# Patient Record
Sex: MALE | Race: WHITE | NOT HISPANIC OR LATINO | URBAN - METROPOLITAN AREA
[De-identification: names, ages, dates, MRNs, and addresses within clinical notes are randomized per-mention and may not be internally consistent; named-entity substitution may affect disease eponyms.]

---

## 2018-06-29 PROBLEM — R60.1 ANASARCA: Status: ACTIVE | Noted: 2018-06-29

## 2018-06-29 PROBLEM — F45.8 ANXIETY HYPERVENTILATION: Status: ACTIVE | Noted: 2018-06-29

## 2018-06-29 PROBLEM — N17.9 ACUTE KIDNEY FAILURE, UNSPECIFIED: Status: ACTIVE | Noted: 2018-06-29

## 2018-06-29 PROBLEM — I27.20: Status: ACTIVE | Noted: 2018-06-29

## 2018-06-29 PROBLEM — N18.30 CHRONIC KIDNEY DISEASE, STAGE III (MODERATE): Status: ACTIVE | Noted: 2018-06-29

## 2018-06-29 PROBLEM — D47.3 THROMBOCYTHEMIA, ESSENTIAL: Status: ACTIVE | Noted: 2018-06-29

## 2018-06-29 PROBLEM — F41.9 ANXIETY HYPERVENTILATION: Status: ACTIVE | Noted: 2018-06-29

## 2022-01-02 PROBLEM — I10 PRIMARY HYPERTENSION: Status: ACTIVE | Noted: 2022-01-02

## 2022-01-02 PROBLEM — J12.82 PNEUMONIA DUE TO COVID-19 VIRUS: Status: ACTIVE | Noted: 2022-01-02

## 2022-01-02 PROBLEM — U07.1 PNEUMONIA DUE TO COVID-19 VIRUS: Status: ACTIVE | Noted: 2022-01-02

## 2022-01-02 PROBLEM — D68.9 COAGULATION DEFECT: Status: ACTIVE | Noted: 2022-01-02

## 2022-01-02 PROBLEM — J96.01 ACUTE HYPOXEMIC RESPIRATORY FAILURE: Status: ACTIVE | Noted: 2022-01-02

## 2022-01-21 ENCOUNTER — EXTERNAL HOME HEALTH (OUTPATIENT)
Dept: HOME HEALTH SERVICES | Facility: HOSPITAL | Age: 77
End: 2022-01-21
Payer: COMMERCIAL

## 2023-04-03 ENCOUNTER — TELEPHONE (OUTPATIENT)
Dept: NEUROLOGY | Facility: CLINIC | Age: 78
End: 2023-04-03
Payer: MEDICARE

## 2023-04-03 NOTE — TELEPHONE ENCOUNTER
Spoke to the pt's wife, she will call back to schedule the pt an appt for memory issues.  Portal message sent.  Left message on the pt's phone in regards to scheduling an appt.  Pt's father  of Alzheimer's disease.

## 2025-05-27 PROBLEM — R79.89 ELEVATED TROPONIN: Status: ACTIVE | Noted: 2025-05-27

## 2025-05-27 PROBLEM — I48.0 PAROXYSMAL A-FIB: Status: ACTIVE | Noted: 2025-05-27

## 2025-05-27 PROBLEM — Z71.89 ACP (ADVANCE CARE PLANNING): Status: ACTIVE | Noted: 2025-05-27

## 2025-05-27 PROBLEM — Z74.09 COVID-19 LONG HAULER MANIFESTING CHRONIC DECREASED MOBILITY AND ENDURANCE: Status: ACTIVE | Noted: 2025-05-27

## 2025-05-27 PROBLEM — U09.9 COVID-19 LONG HAULER MANIFESTING CHRONIC DECREASED MOBILITY AND ENDURANCE: Status: ACTIVE | Noted: 2025-05-27

## 2025-06-01 PROBLEM — N17.9 ACUTE KIDNEY INJURY SUPERIMPOSED ON CHRONIC KIDNEY DISEASE: Status: ACTIVE | Noted: 2025-06-01

## 2025-06-01 PROBLEM — N18.9 ACUTE KIDNEY INJURY SUPERIMPOSED ON CHRONIC KIDNEY DISEASE: Status: ACTIVE | Noted: 2025-06-01

## 2025-06-01 PROBLEM — N40.0 BPH (BENIGN PROSTATIC HYPERPLASIA): Status: ACTIVE | Noted: 2025-06-01

## 2025-06-01 PROBLEM — E87.5 HYPERKALEMIA: Status: ACTIVE | Noted: 2025-06-01

## 2025-06-01 PROBLEM — I27.20 PULMONARY HYPERTENSION: Status: ACTIVE | Noted: 2025-06-01

## 2025-06-01 PROBLEM — I50.32 CHRONIC HEART FAILURE WITH PRESERVED EJECTION FRACTION (HFPEF): Status: ACTIVE | Noted: 2025-06-01

## 2025-06-01 PROBLEM — E78.5 HLD (HYPERLIPIDEMIA): Status: ACTIVE | Noted: 2025-06-01

## 2025-06-01 PROBLEM — K21.9 GERD (GASTROESOPHAGEAL REFLUX DISEASE): Status: ACTIVE | Noted: 2025-06-01

## 2025-06-01 PROBLEM — D72.829 LEUKOCYTOSIS: Status: ACTIVE | Noted: 2025-06-01

## 2025-06-01 PROBLEM — D69.6 THROMBOCYTOPENIA: Status: ACTIVE | Noted: 2025-06-01

## 2025-06-01 PROBLEM — I48.91 ATRIAL FIBRILLATION WITH RVR: Status: ACTIVE | Noted: 2025-06-01

## 2025-06-01 PROBLEM — I50.33 ACUTE ON CHRONIC HEART FAILURE WITH PRESERVED EJECTION FRACTION (HFPEF): Status: ACTIVE | Noted: 2025-06-01

## 2025-06-02 ENCOUNTER — TELEPHONE (OUTPATIENT)
Dept: VASCULAR SURGERY | Facility: CLINIC | Age: 80
End: 2025-06-02
Payer: MEDICARE

## 2025-06-02 PROBLEM — I38 ENDOCARDITIS: Status: ACTIVE | Noted: 2025-06-02

## 2025-06-03 PROBLEM — I34.0 MITRAL VALVE INSUFFICIENCY: Status: ACTIVE | Noted: 2025-06-03

## 2025-06-04 PROBLEM — E87.6 HYPOKALEMIA: Status: ACTIVE | Noted: 2025-06-01

## 2025-06-06 PROBLEM — I48.91 ATRIAL FIBRILLATION WITH RVR: Status: RESOLVED | Noted: 2025-06-01 | Resolved: 2025-06-06

## 2025-06-06 PROBLEM — I50.33 ACUTE ON CHRONIC HEART FAILURE WITH PRESERVED EJECTION FRACTION (HFPEF): Status: RESOLVED | Noted: 2025-06-01 | Resolved: 2025-06-06

## 2025-06-06 PROBLEM — N18.9 ACUTE KIDNEY INJURY SUPERIMPOSED ON CHRONIC KIDNEY DISEASE: Status: RESOLVED | Noted: 2025-06-01 | Resolved: 2025-06-06

## 2025-06-06 PROBLEM — J81.1 PULMONARY EDEMA: Status: ACTIVE | Noted: 2025-06-06

## 2025-06-06 PROBLEM — E87.6 HYPOKALEMIA: Status: RESOLVED | Noted: 2025-06-01 | Resolved: 2025-06-06

## 2025-06-06 PROBLEM — D72.829 LEUKOCYTOSIS: Status: RESOLVED | Noted: 2025-06-01 | Resolved: 2025-06-06

## 2025-06-06 PROBLEM — N17.9 ACUTE KIDNEY INJURY SUPERIMPOSED ON CHRONIC KIDNEY DISEASE: Status: RESOLVED | Noted: 2025-06-01 | Resolved: 2025-06-06

## 2025-06-09 ENCOUNTER — TELEPHONE (OUTPATIENT)
Dept: VASCULAR SURGERY | Facility: CLINIC | Age: 80
End: 2025-06-09
Payer: MEDICARE

## 2025-06-16 ENCOUNTER — TELEPHONE (OUTPATIENT)
Dept: VASCULAR SURGERY | Facility: CLINIC | Age: 80
End: 2025-06-16
Payer: MEDICARE

## 2025-06-16 DIAGNOSIS — I34.0 MITRAL VALVE INSUFFICIENCY, UNSPECIFIED ETIOLOGY: Primary | ICD-10-CM

## 2025-06-16 NOTE — TELEPHONE ENCOUNTER
Copied from CRM #[id number]. Topic: General Inquiry - Patient Advice  >> Jun 16, 2025 10:19 AM Raul wrote:  Type: Needs Medical Advice  Who Called:  pt  Symptoms (please be specific):    How long has patient had these symptoms:    Pharmacy name and phone #:    Best Call Back Number: [phone]   Additional Information: Speak to staff   Pt states number doesn't come through to please text him   Thanks

## 2025-06-16 NOTE — TELEPHONE ENCOUNTER
Finishing antibiotics on 7/12 but want to be scheduled for surgery before Dr Baxter leaves on 7/12

## 2025-06-26 ENCOUNTER — OFFICE VISIT (OUTPATIENT)
Dept: INFECTIOUS DISEASES | Facility: CLINIC | Age: 80
End: 2025-06-26
Payer: MEDICARE

## 2025-06-26 ENCOUNTER — OFFICE VISIT (OUTPATIENT)
Dept: VASCULAR SURGERY | Facility: CLINIC | Age: 80
End: 2025-06-26
Payer: MEDICARE

## 2025-06-26 VITALS
TEMPERATURE: 99 F | SYSTOLIC BLOOD PRESSURE: 143 MMHG | DIASTOLIC BLOOD PRESSURE: 84 MMHG | HEART RATE: 85 BPM | RESPIRATION RATE: 14 BRPM | HEIGHT: 68 IN | WEIGHT: 162.06 LBS | BODY MASS INDEX: 24.56 KG/M2

## 2025-06-26 VITALS
BODY MASS INDEX: 24.55 KG/M2 | HEART RATE: 85 BPM | SYSTOLIC BLOOD PRESSURE: 143 MMHG | HEIGHT: 68 IN | DIASTOLIC BLOOD PRESSURE: 84 MMHG | WEIGHT: 162 LBS

## 2025-06-26 DIAGNOSIS — I34.0 MITRAL VALVE INSUFFICIENCY, UNSPECIFIED ETIOLOGY: ICD-10-CM

## 2025-06-26 DIAGNOSIS — I33.0 ACUTE BACTERIAL ENDOCARDITIS: Primary | ICD-10-CM

## 2025-06-26 DIAGNOSIS — I48.91 ATRIAL FIBRILLATION WITH RVR: Primary | ICD-10-CM

## 2025-06-26 PROCEDURE — 99214 OFFICE O/P EST MOD 30 MIN: CPT | Mod: PBBFAC,PN | Performed by: THORACIC SURGERY (CARDIOTHORACIC VASCULAR SURGERY)

## 2025-06-26 PROCEDURE — 99999 PR PBB SHADOW E&M-EST. PATIENT-LVL IV: CPT | Mod: PBBFAC,,, | Performed by: THORACIC SURGERY (CARDIOTHORACIC VASCULAR SURGERY)

## 2025-06-26 PROCEDURE — 99213 OFFICE O/P EST LOW 20 MIN: CPT | Mod: S$PBB,,, | Performed by: INTERNAL MEDICINE

## 2025-06-26 PROCEDURE — 99999 PR PBB SHADOW E&M-EST. PATIENT-LVL IV: CPT | Mod: PBBFAC,,, | Performed by: INTERNAL MEDICINE

## 2025-06-26 PROCEDURE — 99214 OFFICE O/P EST MOD 30 MIN: CPT | Mod: PBBFAC,27,PO | Performed by: INTERNAL MEDICINE

## 2025-06-26 RX ORDER — SILDENAFIL CITRATE 20 MG/1
40 TABLET ORAL 4 TIMES DAILY
COMMUNITY
Start: 2025-06-07

## 2025-06-26 NOTE — PROGRESS NOTES
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  It appears that patient has been complaining of palpitations  reason for which patient came to this hospital for further evaluation.     During initial evaluation in the ED patient was found to be hypotensive, tachycardic, slightly hypoxic.  Patient was noted to have irregular rhythm.    Initial workup showed evidence of leukocytosis and elevated creatinine.  Patient is Cardiology, Dr. Fitzpatrick performed a JESSICA with evidence of  mitral valve infective endocarditis     The patient endorses multiple issues previous month.  Recent teeth cleaning, percutaneous injection on piriformis  and recently diagnosed with pneumonia for which patient has been receiving multiple antibiotics and steroids     Evaluated by Infectious diseases at the time.  Blood cultures remained negative.  Further workup for culture negative endocarditis was also negative   Patient was started on daptomycin and ceftriaxone for 6 weeks empirically for the treatment of culture negative endocarditis expected to be finished on 07/14/2025    Interval HPI  Patient is here for follow-up.    Afebrile   Receiving ceftriaxone and daptomycin with no obvious side effects    Past Medical History:   Diagnosis Date    Anesthesia     D5NS shuts kidneys down, Sensitivity to anesthesia, SOB,  heartrate > 80bpm,  makes saturation rate decrease    CHF (congestive heart failure)     CKD (chronic kidney disease) stage 3, GFR 30-59 ml/min     Disease of pancreas, unspecified     Pancreatic pseudocyst    Hypertension     Kidney stone     Lymphedema     Lymph nodes removed left arm    Neuropraxia of left lower extremity     Left medial arch, can not control kicking.    Paroxysmal atrial fibrillation     Renal disorder     Thyroid disease         Past Surgical History:   Procedure Laterality Date    COLONOSCOPY      CHADWICK MAZE PROCEDURE N/A 7/11/2025    Procedure: CHADWICK MAZE PROCEDURE;  Surgeon: Jose Baxter MD;  Location: [facility] OR;  Service: Cardiothoracic;  Laterality: N/A;    ECHOCARDIOGRAM,TRANSESOPHAGEAL  7/11/2025    Procedure: Transesophageal echo (JESSICA) intra-procedure log documentation;  Surgeon: Jose Baxter MD;  Location: [facility] OR;  Service: Cardiothoracic;;    ECHOCARDIOGRAPHY      5 or more    INSERTION, CATHETER, TUNNELED  7/18/2025    Procedure: Tunnel Catheter placement Rm 2605;  Surgeon: Beryl Prieto MD;  Location: [facility] CATH;  Service: Vascular;;    LEFT HEART CATHETERIZATION  06/05/2025    Procedure: Left heart cath;  Surgeon: Leon Reyes MD;  Location: [facility] CATH;  Service: Cardiology;;    LYMPH NODE BIOPSY/EXCISION Left     left arm    MITRAL VALVE REPLACEMENT N/A 7/11/2025    Procedure: REPLACEMENT, MITRAL VALVE;  Surgeon: Jose Baxter MD;  Location: [facility] OR;  Service: Cardiothoracic;  Laterality: N/A;    TRANSESOPHAGEAL ECHOCARDIOGRAM WITH POSSIBLE CARDIOVERSION (JESSICA W/ POSS CARDIOVERSION) N/A 06/02/2025    Procedure: TRANSESOPHAGEAL ECHOCARDIOGRAM WITH POSSIBLE CARDIOVERSION (JESSICA W/ POSS CARDIOVERSION);  Surgeon: Leon Reyes MD;  Location: [facility];  Service: Cardiology;  Laterality: N/A;       Review of patient's allergies indicates:   Allergen Reactions    Metoprolol Other (See Comments) and Rash    Penicillins Other (See Comments) and Rash    Zithromax [azithromycin] Nausea Only     Mild       Family History   Problem Relation Name Age of Onset    Rectal cancer Mother      Dementia Father         Social History     Socioeconomic History    Marital status:    Tobacco Use    Smoking status: Never     Passive exposure: Never    Smokeless tobacco: Never   Vaping Use    Vaping status: Never Used   Substance and Sexual Activity    Alcohol use: Not Currently    Drug use: Never     Sexual activity: Not Currently     Social Drivers of Health     Financial Resource Strain: Low Risk  (7/11/2025)    Overall Financial Resource Strain (CARDIA)     Difficulty of Paying Living Expenses: Not hard at all   Food Insecurity: No Food Insecurity (7/11/2025)    Hunger Vital Sign     Worried About Running Out of Food in the Last Year: Never true     Ran Out of Food in the Last Year: Never true   Transportation Needs: No Transportation Needs (7/11/2025)    PRAPARE - Transportation     Lack of Transportation (Medical): No     Lack of Transportation (Non-Medical): No   Stress: No Stress Concern Present (7/11/2025)    [employer] [place] [employer] - Occupational Stress Questionnaire     Feeling of Stress : Not at all   Housing Stability: Low Risk  (7/11/2025)    Housing Stability Vital Sign     Unable to Pay for Housing in the Last Year: No     Number of Times Moved in the Last Year: 0     Homeless in the Last Year: No       Current Outpatient Medications   Medication Instructions    alfuzosin (UROXATRAL) 10 mg, 2 times daily    amLODIPine (NORVASC) 5 mg, Oral, Daily, Takes for migraine.    aspirin (ECOTRIN) 81 mg, Oral, Every morning    cholecalciferol (vitamin D3) 5,000 Units, Nightly    DAPTOmycin 50 mg/mL in 0.9% NaCl solution 600 mg, Intravenous, Daily    ferrous sulfate (FEOSOL) 325 mg, With breakfast    furosemide (LASIX) 20 mg, Oral, 2 times daily    HYDROcodone-acetaminophen (NORCO)  mg per tablet 1 tablet, Every 6 hours PRN    Lactobacillus rhamnosus GG (CULTURELLE) 10 billion cell capsule 1 capsule, Oral, Daily    latanoprost 0.005 % ophthalmic solution 1 drop, Both Eyes, Nightly    levothyroxine (SYNTHROID) 25 mcg, Before breakfast    LORazepam (ATIVAN) 0.5-1 mg, Oral, Every 6 hours PRN    nebivoloL (BYSTOLIC) 2.5 mg, 3 times daily    omeprazole 10 mg, Oral, Nightly    potassium chloride (K-TAB) 20 mEq 40 mEq, Oral, Daily    potassium chloride SA (K-DUR,KLOR-CON) 20 MEQ tablet 20 mEq,  Nightly    sildenafil (REVATIO) 60 mg, 2 times daily    testosterone cypionate (DEPOTESTOTERONE CYPIONATE) 200 mg/mL injection 0.7 mLs, Every Wednesday    valsartan (DIOVAN) 80 mg, Oral, 2 times daily    zolpidem (AMBIEN) 5 mg, Daily    zolpidem (AMBIEN) 5 mg, Nightly         Review of Systems   Constitutional:  Negative for activity change, chills, diaphoresis, fatigue, fever and unexpected weight change.   HENT:  Negative for sore throat.    Eyes:  Negative for photophobia and visual disturbance.   Respiratory:  Negative for cough and shortness of breath.    Cardiovascular:  Negative for chest pain and leg swelling.   Gastrointestinal:  Negative for abdominal distention, abdominal pain, nausea and vomiting.   Genitourinary:  Negative for difficulty urinating, dysuria and flank pain.   Musculoskeletal:  Negative for arthralgias.   Skin:  Negative for color change and rash.   Allergic/Immunologic: Negative for immunocompromised state.   Neurological:  Negative for dizziness and headaches.   Psychiatric/Behavioral:  The patient is not nervous/anxious.            Objective:     Vitals:    06/26/25 0909   BP: (!) 143/84   Pulse: 85   Resp: 14   Temp: 98.5 °F (36.9 °C)       Body mass index is 24.64 kg/m².         Physical Exam  Vitals reviewed.   Constitutional:       General: He is not in acute distress.     Appearance: Normal appearance. He is well-developed. He is not ill-appearing.   HENT:      Head: Normocephalic and atraumatic.      Right Ear: External ear normal.      Left Ear: External ear normal.      Nose: Nose normal.   Eyes:      General: No scleral icterus.        Right eye: No discharge.         Left eye: No discharge.      Pupils: Pupils are equal, round, and reactive to light.   Cardiovascular:      Rate and Rhythm: Normal rate and regular rhythm.      Heart sounds: Normal heart sounds. No murmur heard.  Pulmonary:      Effort: Pulmonary effort is normal. No respiratory distress.      Breath sounds:  Normal breath sounds. No wheezing.   Abdominal:      General: There is no distension.      Palpations: Abdomen is soft.      Tenderness: There is no abdominal tenderness.   Musculoskeletal:         General: Normal range of motion.      Cervical back: Normal range of motion and neck supple. No rigidity.   Lymphadenopathy:      Cervical: No cervical adenopathy.   Skin:     General: Skin is warm and dry.      Coloration: Skin is not jaundiced.   Neurological:      Mental Status: He is alert and oriented to person, place, and time.   Psychiatric:         Mood and Affect: Mood normal.         Speech: Speech normal.         Behavior: Behavior normal.         Judgment: Judgment normal.           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  - JESSICA: Mitral mass attached to the P2 on the left atrium size of the based highly suggestive  - 6/2/25 Blood cultures: Negative   - MRSA by PCR: Negative   - Lumbar MRI:  Negative for acute infection   - Bartonella AV:  Negative  - Q fever AV:  Negative  - Brucella ab:  Negative  - evaluated by CTS - will need MVR following IV abx course for endocarditis  - receiving IV CTX and Daptomycni since 6/2/25     # LISA on CKD  - appears to be resolving  - renally dose abx as needed     # Recent diagnosis of pneumonia  - MRSA by PCR:  negative  - no cough to collect sputum currently     # CHF     # Pulmonary hypertension    Moose was seen today for follow-up.    Diagnoses and all orders for this visit:    Acute bacterial endocarditis  -     Blood culture; Future  -     Blood culture; Future         Plan:     - Continue Daptomycin 8mg/kg IV q24h  - Continue Ceftriaxone 2g IV q24h  - Length of therapy: 6 weeks (EOC 7/14/25)       Greater than 20 minutes was spent on this encounter, which included: review of recent encounters, review and interpretation of labs/images, obtaining pertinent history, performing a physical examination, counseling and educating the  patient/family/caregiver, ordering medications/tests, documenting in the electronic health record, and coordinating care with necessary providers.    Milton Choi MD  Infectious Diseases

## 2025-06-26 NOTE — PROGRESS NOTES
This patient has severe mitral insufficiency.  He was recently hospitalized with endocarditis.  He is currently finishing out his treatment for the endocarditis with intravenous antibiotics.  Medicines are noted and part of the epic record.  His problem list was reviewed.    He has had renal insufficiency which largely has resolved.  He does have shortness of breath with exertion.  He does state that his oxygen saturation at baseline is in the low 90s.    He is not a smoker.    On exam vital signs are stable.  Pupils are equal and round reactive to light.  Neck is supple.  Chest is equal breath sounds.  Heart is in a regular rate and rhythm.  Abdomen is benign.  Perfusion to the legs and feet seems to be satisfactory.    The recent studies were reviewed.    Recommendation is for mitral valve replacement with pulmonary vein isolation and atrial appendage excision.  The procedure and risks were discussed.  The patient seems to be understanding and agreeable.

## 2025-06-30 ENCOUNTER — TELEPHONE (OUTPATIENT)
Dept: INFECTIOUS DISEASES | Facility: CLINIC | Age: 80
End: 2025-06-30

## 2025-06-30 NOTE — TELEPHONE ENCOUNTER
Pt seen ID clinic by Dr. Choi for follow up on 6/26/25.  [facility] or [facility] to remove PICC at EOC 7/14/25  Repeat blooc cultures on 7/20/25/25, will ask pt to please go to [facility] to have drawn or may send order to [facility], calvin

## 2025-07-01 DIAGNOSIS — I05.0 MITRAL VALVE STENOSIS: ICD-10-CM

## 2025-07-01 DIAGNOSIS — I34.0 MITRAL VALVE INSUFFICIENCY, UNSPECIFIED ETIOLOGY: Primary | ICD-10-CM

## 2025-07-01 RX ORDER — MUPIROCIN 20 MG/G
OINTMENT TOPICAL
OUTPATIENT
Start: 2025-07-01

## 2025-07-01 RX ORDER — CHLORHEXIDINE GLUCONATE ORAL RINSE 1.2 MG/ML
10 SOLUTION DENTAL
OUTPATIENT
Start: 2025-07-01

## 2025-07-01 RX ORDER — VANCOMYCIN/0.9 % SOD CHLORIDE 1 G/100 ML
1000 PLASTIC BAG, INJECTION (ML) INTRAVENOUS
OUTPATIENT
Start: 2025-07-01

## 2025-07-01 RX ORDER — HYDROCODONE BITARTRATE AND ACETAMINOPHEN 500; 5 MG/1; MG/1
TABLET ORAL
OUTPATIENT
Start: 2025-07-01

## 2025-07-02 ENCOUNTER — TELEPHONE (OUTPATIENT)
Dept: INFECTIOUS DISEASES | Facility: CLINIC | Age: 80
End: 2025-07-02
Payer: MEDICARE

## 2025-07-02 NOTE — TELEPHONE ENCOUNTER
Per Dr. Choi -   IV Daptomycin stopped today d/t pt's c/o possible allergic reaction and discussion.  Dr. Choi had discussion also with Dr. Prado.  Confirmed with Pk at \[facility]\"", stop IV Dapto, continue IV Ceftriaxone

## 2025-07-15 PROBLEM — J90 PLEURAL EFFUSION: Status: ACTIVE | Noted: 2025-07-15

## 2025-07-23 PROBLEM — D62 POSTOPERATIVE ANEMIA DUE TO ACUTE BLOOD LOSS: Status: ACTIVE | Noted: 2025-07-23

## 2025-07-23 PROBLEM — N18.32 STAGE 3B CHRONIC KIDNEY DISEASE: Status: ACTIVE | Noted: 2025-07-23

## 2025-07-23 PROBLEM — E87.20 METABOLIC ACIDOSIS: Status: ACTIVE | Noted: 2025-07-23

## 2025-07-23 PROBLEM — J84.10 PULMONARY FIBROSIS: Status: ACTIVE | Noted: 2025-07-23

## 2025-07-23 PROBLEM — E03.9 HYPOTHYROIDISM: Status: ACTIVE | Noted: 2025-07-23

## 2025-07-23 PROBLEM — I48.91 ATRIAL FIBRILLATION: Status: ACTIVE | Noted: 2025-07-23

## 2025-08-07 ENCOUNTER — OFFICE VISIT (OUTPATIENT)
Dept: VASCULAR SURGERY | Facility: CLINIC | Age: 80
End: 2025-08-07
Payer: MEDICARE

## 2025-08-07 ENCOUNTER — TELEPHONE (OUTPATIENT)
Dept: VASCULAR SURGERY | Facility: CLINIC | Age: 80
End: 2025-08-07
Payer: MEDICARE

## 2025-08-07 VITALS
DIASTOLIC BLOOD PRESSURE: 82 MMHG | SYSTOLIC BLOOD PRESSURE: 154 MMHG | WEIGHT: 170 LBS | BODY MASS INDEX: 25.84 KG/M2 | HEART RATE: 67 BPM

## 2025-08-07 DIAGNOSIS — Z95.2 S/P MITRAL VALVE REPLACEMENT: Primary | ICD-10-CM

## 2025-08-07 PROCEDURE — 99999 PR PBB SHADOW E&M-EST. PATIENT-LVL II: CPT | Mod: PBBFAC,,, | Performed by: THORACIC SURGERY (CARDIOTHORACIC VASCULAR SURGERY)

## 2025-08-07 PROCEDURE — 99024 POSTOP FOLLOW-UP VISIT: CPT | Mod: POP,,, | Performed by: THORACIC SURGERY (CARDIOTHORACIC VASCULAR SURGERY)

## 2025-08-07 PROCEDURE — 99212 OFFICE O/P EST SF 10 MIN: CPT | Mod: PBBFAC,PN | Performed by: THORACIC SURGERY (CARDIOTHORACIC VASCULAR SURGERY)

## 2025-08-07 RX ORDER — PREDNISOLONE 5 MG/1
TABLET ORAL
COMMUNITY
Start: 2025-07-22

## 2025-08-07 RX ORDER — AMLODIPINE BESYLATE 5 MG/1
TABLET ORAL
COMMUNITY

## 2025-08-07 RX ORDER — QUETIAPINE FUMARATE 25 MG/1
25 TABLET, FILM COATED ORAL NIGHTLY
COMMUNITY
Start: 2025-08-04

## 2025-08-07 RX ORDER — POTASSIUM CHLORIDE 20 MEQ/1
20 TABLET, EXTENDED RELEASE ORAL
COMMUNITY

## 2025-08-07 RX ORDER — FENOFIBRATE 160 MG/1
TABLET ORAL
COMMUNITY

## 2025-08-07 RX ORDER — LEVOTHYROXINE SODIUM 50 UG/1
50 TABLET ORAL
COMMUNITY
Start: 2025-08-04

## 2025-08-07 RX ORDER — HYDROXYZINE PAMOATE 25 MG/1
25 CAPSULE ORAL EVERY 4 HOURS PRN
COMMUNITY
Start: 2025-08-03

## 2025-08-07 RX ORDER — AMIODARONE HYDROCHLORIDE 100 MG/1
100 TABLET ORAL
COMMUNITY
Start: 2025-08-06

## 2025-08-07 RX ORDER — LATANOPROST 50 UG/ML
1 SOLUTION/ DROPS OPHTHALMIC
COMMUNITY
Start: 2025-07-22

## 2025-08-07 RX ORDER — TRAZODONE HYDROCHLORIDE 50 MG/1
TABLET ORAL
COMMUNITY
Start: 2025-07-22

## 2025-08-07 RX ORDER — ALPRAZOLAM 0.5 MG/1
0.5 TABLET ORAL 2 TIMES DAILY PRN
COMMUNITY

## 2025-08-07 NOTE — PROGRESS NOTES
This patient is status post mitral valve replacement with modified Maze procedure.  He comes back to the office today in follow-up.  He still remains in renal insufficiency and requires regular dialysis.  He states that his creatinine runs between 4 and 5.    On exam vital signs are stable.  Surgical wounds are intact.  His stitches have been removed.    His valve tones are regular.  His rhythm is controlled and seems to be regular also.    I think at this point he can resume his regular activities with minimal restrictions.  He seems to be understanding.    He can see us on an as-needed basis.  Hopefully his kidney insufficiency will improve.  He should follow-up with medical management.

## 2025-08-07 NOTE — TELEPHONE ENCOUNTER
Copied from CRM #[id number]. Topic: Appointments - Appointment Access  >> Aug 7, 2025  8:46 AM Ab wrote:  Type:  Patient Returning Call    Who Called: Moose     Does the patient know what this is regarding?: Rescheduling     Would the patient rather a call back or a response via [facility]? Call back     Best Call Back Number: please call back at [phone]    Additional Information: Patient is needing to reschedule Post-Op for a later time

## 2025-08-19 ENCOUNTER — TELEPHONE (OUTPATIENT)
Dept: VASCULAR SURGERY | Facility: CLINIC | Age: 80
End: 2025-08-19
Payer: MEDICARE

## 2025-08-20 ENCOUNTER — TELEPHONE (OUTPATIENT)
Dept: VASCULAR SURGERY | Facility: CLINIC | Age: 80
End: 2025-08-20
Payer: MEDICARE

## 2025-08-28 ENCOUNTER — OFFICE VISIT (OUTPATIENT)
Facility: CLINIC | Age: 80
End: 2025-08-28
Payer: MEDICARE

## 2025-08-28 ENCOUNTER — HOSPITAL ENCOUNTER (OUTPATIENT)
Dept: RADIOLOGY | Facility: HOSPITAL | Age: 80
Discharge: HOME | End: 2025-08-28
Attending: STUDENT IN AN ORGANIZED HEALTH CARE EDUCATION/TRAINING PROGRAM
Payer: MEDICARE

## 2025-08-28 DIAGNOSIS — J90 PLEURAL EFFUSION: ICD-10-CM

## 2025-08-28 DIAGNOSIS — N18.6 ESRD (END STAGE RENAL DISEASE): Primary | ICD-10-CM

## 2025-08-28 DIAGNOSIS — J90 PLEURAL EFFUSION: Primary | ICD-10-CM

## 2025-08-28 PROCEDURE — 36589 REMOVAL TUNNELED CV CATH: CPT | Mod: PBBFAC,PO | Performed by: STUDENT IN AN ORGANIZED HEALTH CARE EDUCATION/TRAINING PROGRAM

## 2025-08-28 PROCEDURE — 71046 X-RAY EXAM CHEST 2 VIEWS: CPT | Mod: TC,PO

## 2025-08-28 PROCEDURE — 71046 X-RAY EXAM CHEST 2 VIEWS: CPT | Mod: 26,,, | Performed by: STUDENT IN AN ORGANIZED HEALTH CARE EDUCATION/TRAINING PROGRAM
